# Patient Record
Sex: MALE | Race: WHITE | NOT HISPANIC OR LATINO | Employment: OTHER | URBAN - METROPOLITAN AREA
[De-identification: names, ages, dates, MRNs, and addresses within clinical notes are randomized per-mention and may not be internally consistent; named-entity substitution may affect disease eponyms.]

---

## 2022-07-20 ENCOUNTER — PRE-ADMISSION TESTING (OUTPATIENT)
Dept: ADMISSIONS | Facility: MEDICAL CENTER | Age: 72
End: 2022-07-20
Attending: UROLOGY
Payer: MEDICARE

## 2022-07-20 DIAGNOSIS — Z01.810 PRE-OPERATIVE CARDIOVASCULAR EXAMINATION: ICD-10-CM

## 2022-07-20 DIAGNOSIS — Z01.812 PRE-OPERATIVE LABORATORY EXAMINATION: ICD-10-CM

## 2022-07-20 LAB
ANION GAP SERPL CALC-SCNC: 14 MMOL/L (ref 7–16)
APPEARANCE UR: CLEAR
BACTERIA #/AREA URNS HPF: ABNORMAL /HPF
BILIRUB UR QL STRIP.AUTO: NEGATIVE
BUN SERPL-MCNC: 16 MG/DL (ref 8–22)
CALCIUM SERPL-MCNC: 9.2 MG/DL (ref 8.4–10.2)
CHLORIDE SERPL-SCNC: 102 MMOL/L (ref 96–112)
CO2 SERPL-SCNC: 23 MMOL/L (ref 20–33)
COLOR UR: YELLOW
CREAT SERPL-MCNC: 0.56 MG/DL (ref 0.5–1.4)
EKG IMPRESSION: NORMAL
EPI CELLS #/AREA URNS HPF: ABNORMAL /HPF
ERYTHROCYTE [DISTWIDTH] IN BLOOD BY AUTOMATED COUNT: 50.4 FL (ref 35.9–50)
GFR SERPLBLD CREATININE-BSD FMLA CKD-EPI: 105 ML/MIN/1.73 M 2
GLUCOSE SERPL-MCNC: 136 MG/DL (ref 65–99)
GLUCOSE UR STRIP.AUTO-MCNC: NEGATIVE MG/DL
HCT VFR BLD AUTO: 41.1 % (ref 42–52)
HGB BLD-MCNC: 13.9 G/DL (ref 14–18)
INR PPP: 0.98 (ref 0.87–1.13)
KETONES UR STRIP.AUTO-MCNC: ABNORMAL MG/DL
LEUKOCYTE ESTERASE UR QL STRIP.AUTO: ABNORMAL
MCH RBC QN AUTO: 32.6 PG (ref 27–33)
MCHC RBC AUTO-ENTMCNC: 33.8 G/DL (ref 33.7–35.3)
MCV RBC AUTO: 96.5 FL (ref 81.4–97.8)
MICRO URNS: ABNORMAL
NITRITE UR QL STRIP.AUTO: POSITIVE
PH UR STRIP.AUTO: 6 [PH] (ref 5–8)
PLATELET # BLD AUTO: 262 K/UL (ref 164–446)
PMV BLD AUTO: 9.5 FL (ref 9–12.9)
POTASSIUM SERPL-SCNC: 3.2 MMOL/L (ref 3.6–5.5)
PROT UR QL STRIP: NEGATIVE MG/DL
PROTHROMBIN TIME: 12.6 SEC (ref 12–14.6)
RBC # BLD AUTO: 4.26 M/UL (ref 4.7–6.1)
RBC # URNS HPF: ABNORMAL /HPF
RBC UR QL AUTO: NEGATIVE
SODIUM SERPL-SCNC: 139 MMOL/L (ref 135–145)
SP GR UR STRIP.AUTO: <=1.005
WBC # BLD AUTO: 6.9 K/UL (ref 4.8–10.8)
WBC #/AREA URNS HPF: ABNORMAL /HPF

## 2022-07-20 PROCEDURE — 80048 BASIC METABOLIC PNL TOTAL CA: CPT

## 2022-07-20 PROCEDURE — 87086 URINE CULTURE/COLONY COUNT: CPT

## 2022-07-20 PROCEDURE — 81001 URINALYSIS AUTO W/SCOPE: CPT

## 2022-07-20 PROCEDURE — 36415 COLL VENOUS BLD VENIPUNCTURE: CPT

## 2022-07-20 PROCEDURE — 87186 SC STD MICRODIL/AGAR DIL: CPT

## 2022-07-20 PROCEDURE — 93005 ELECTROCARDIOGRAM TRACING: CPT

## 2022-07-20 PROCEDURE — 85610 PROTHROMBIN TIME: CPT

## 2022-07-20 PROCEDURE — 93010 ELECTROCARDIOGRAM REPORT: CPT | Performed by: INTERNAL MEDICINE

## 2022-07-20 PROCEDURE — 87077 CULTURE AEROBIC IDENTIFY: CPT

## 2022-07-20 PROCEDURE — 85027 COMPLETE CBC AUTOMATED: CPT

## 2022-07-20 RX ORDER — ATORVASTATIN CALCIUM 10 MG/1
1 TABLET, FILM COATED ORAL
COMMUNITY
Start: 2022-05-25 | End: 2022-07-20

## 2022-07-20 RX ORDER — DOXYCYCLINE 100 MG/1
CAPSULE ORAL
COMMUNITY
Start: 2022-07-14 | End: 2022-07-20

## 2022-07-20 RX ORDER — POTASSIUM CHLORIDE 750 MG/1
TABLET, EXTENDED RELEASE ORAL
COMMUNITY
Start: 2022-07-08

## 2022-07-20 RX ORDER — TAMSULOSIN HYDROCHLORIDE 0.4 MG/1
0.4 CAPSULE ORAL EVERY EVENING
COMMUNITY
Start: 2022-06-01

## 2022-07-20 RX ORDER — PREDNISONE 5 MG/1
5 TABLET ORAL EVERY EVENING
COMMUNITY
Start: 2022-05-27

## 2022-07-20 RX ORDER — ABIRATERONE ACETATE 250 MG/1
2 TABLET ORAL DAILY
COMMUNITY
Start: 2022-05-27

## 2022-07-20 RX ORDER — DOXYCYCLINE 100 MG/1
CAPSULE ORAL
COMMUNITY
End: 2022-07-20

## 2022-07-20 RX ORDER — ATORVASTATIN CALCIUM 10 MG/1
10 TABLET, FILM COATED ORAL EVERY EVENING
COMMUNITY
Start: 2022-05-26

## 2022-07-20 NOTE — OR NURSING
"Preadmit appointment: \" Preparing for your Procedure information\" sheet given to patient with verbal and written instructions. Patient instructed to continue prescribed medications through the day before surgery, instructed to take the following medications the day of surgery per anesthesia protocol: ABIRATERONE                STOP/BANG protocol initiated and JOVANNY information sheet given to pt and encouraged to discuss possible JOVANNY with PCP.  "

## 2022-07-22 LAB
BACTERIA UR CULT: ABNORMAL
BACTERIA UR CULT: ABNORMAL
SIGNIFICANT IND 70042: ABNORMAL
SITE SITE: ABNORMAL
SOURCE SOURCE: ABNORMAL

## 2022-07-28 ENCOUNTER — ANESTHESIA (OUTPATIENT)
Dept: SURGERY | Facility: MEDICAL CENTER | Age: 72
End: 2022-07-28
Payer: MEDICARE

## 2022-07-28 ENCOUNTER — HOSPITAL ENCOUNTER (OUTPATIENT)
Facility: MEDICAL CENTER | Age: 72
End: 2022-07-28
Attending: UROLOGY | Admitting: UROLOGY
Payer: MEDICARE

## 2022-07-28 ENCOUNTER — ANESTHESIA EVENT (OUTPATIENT)
Dept: SURGERY | Facility: MEDICAL CENTER | Age: 72
End: 2022-07-28
Payer: MEDICARE

## 2022-07-28 VITALS
SYSTOLIC BLOOD PRESSURE: 153 MMHG | HEIGHT: 70 IN | RESPIRATION RATE: 18 BRPM | OXYGEN SATURATION: 94 % | BODY MASS INDEX: 23.66 KG/M2 | HEART RATE: 87 BPM | TEMPERATURE: 97.5 F | DIASTOLIC BLOOD PRESSURE: 91 MMHG | WEIGHT: 165.24 LBS

## 2022-07-28 LAB
GLUCOSE BLD STRIP.AUTO-MCNC: 110 MG/DL (ref 65–99)
PATHOLOGY CONSULT NOTE: NORMAL

## 2022-07-28 PROCEDURE — 160025 RECOVERY II MINUTES (STATS): Performed by: UROLOGY

## 2022-07-28 PROCEDURE — 88305 TISSUE EXAM BY PATHOLOGIST: CPT

## 2022-07-28 PROCEDURE — 160002 HCHG RECOVERY MINUTES (STAT): Performed by: UROLOGY

## 2022-07-28 PROCEDURE — 160036 HCHG PACU - EA ADDL 30 MINS PHASE I: Performed by: UROLOGY

## 2022-07-28 PROCEDURE — 88341 IMHCHEM/IMCYTCHM EA ADD ANTB: CPT

## 2022-07-28 PROCEDURE — 160035 HCHG PACU - 1ST 60 MINS PHASE I: Performed by: UROLOGY

## 2022-07-28 PROCEDURE — 700101 HCHG RX REV CODE 250: Performed by: ANESTHESIOLOGY

## 2022-07-28 PROCEDURE — 160028 HCHG SURGERY MINUTES - 1ST 30 MINS LEVEL 3: Performed by: UROLOGY

## 2022-07-28 PROCEDURE — 160046 HCHG PACU - 1ST 60 MINS PHASE II: Performed by: UROLOGY

## 2022-07-28 PROCEDURE — 99100 ANES PT EXTEME AGE<1 YR&>70: CPT | Performed by: ANESTHESIOLOGY

## 2022-07-28 PROCEDURE — 88342 IMHCHEM/IMCYTCHM 1ST ANTB: CPT

## 2022-07-28 PROCEDURE — 160048 HCHG OR STATISTICAL LEVEL 1-5: Performed by: UROLOGY

## 2022-07-28 PROCEDURE — 00914 ANES TRURL PX RESCJ PRST8: CPT | Performed by: ANESTHESIOLOGY

## 2022-07-28 PROCEDURE — 160009 HCHG ANES TIME/MIN: Performed by: UROLOGY

## 2022-07-28 PROCEDURE — 160039 HCHG SURGERY MINUTES - EA ADDL 1 MIN LEVEL 3: Performed by: UROLOGY

## 2022-07-28 PROCEDURE — 700111 HCHG RX REV CODE 636 W/ 250 OVERRIDE (IP): Performed by: ANESTHESIOLOGY

## 2022-07-28 PROCEDURE — 82962 GLUCOSE BLOOD TEST: CPT

## 2022-07-28 RX ORDER — OXYCODONE HCL 5 MG/5 ML
5 SOLUTION, ORAL ORAL
Status: DISCONTINUED | OUTPATIENT
Start: 2022-07-28 | End: 2022-07-28 | Stop reason: HOSPADM

## 2022-07-28 RX ORDER — HALOPERIDOL 5 MG/ML
1 INJECTION INTRAMUSCULAR
Status: DISCONTINUED | OUTPATIENT
Start: 2022-07-28 | End: 2022-07-28 | Stop reason: HOSPADM

## 2022-07-28 RX ORDER — SODIUM CHLORIDE, SODIUM LACTATE, POTASSIUM CHLORIDE, CALCIUM CHLORIDE 600; 310; 30; 20 MG/100ML; MG/100ML; MG/100ML; MG/100ML
INJECTION, SOLUTION INTRAVENOUS CONTINUOUS
Status: DISCONTINUED | OUTPATIENT
Start: 2022-07-28 | End: 2022-07-28 | Stop reason: HOSPADM

## 2022-07-28 RX ORDER — HYDROMORPHONE HYDROCHLORIDE 1 MG/ML
0.4 INJECTION, SOLUTION INTRAMUSCULAR; INTRAVENOUS; SUBCUTANEOUS
Status: DISCONTINUED | OUTPATIENT
Start: 2022-07-28 | End: 2022-07-28 | Stop reason: HOSPADM

## 2022-07-28 RX ORDER — ONDANSETRON 2 MG/ML
4 INJECTION INTRAMUSCULAR; INTRAVENOUS
Status: DISCONTINUED | OUTPATIENT
Start: 2022-07-28 | End: 2022-07-28 | Stop reason: HOSPADM

## 2022-07-28 RX ORDER — OXYCODONE HCL 5 MG/5 ML
10 SOLUTION, ORAL ORAL
Status: DISCONTINUED | OUTPATIENT
Start: 2022-07-28 | End: 2022-07-28 | Stop reason: HOSPADM

## 2022-07-28 RX ORDER — DIPHENHYDRAMINE HYDROCHLORIDE 50 MG/ML
12.5 INJECTION INTRAMUSCULAR; INTRAVENOUS
Status: DISCONTINUED | OUTPATIENT
Start: 2022-07-28 | End: 2022-07-28 | Stop reason: HOSPADM

## 2022-07-28 RX ORDER — MEPERIDINE HYDROCHLORIDE 25 MG/ML
6.25 INJECTION INTRAMUSCULAR; INTRAVENOUS; SUBCUTANEOUS
Status: DISCONTINUED | OUTPATIENT
Start: 2022-07-28 | End: 2022-07-28 | Stop reason: HOSPADM

## 2022-07-28 RX ORDER — CEFAZOLIN SODIUM 1 G/3ML
INJECTION, POWDER, FOR SOLUTION INTRAMUSCULAR; INTRAVENOUS PRN
Status: DISCONTINUED | OUTPATIENT
Start: 2022-07-28 | End: 2022-07-28 | Stop reason: SURG

## 2022-07-28 RX ORDER — HYDROMORPHONE HYDROCHLORIDE 1 MG/ML
0.1 INJECTION, SOLUTION INTRAMUSCULAR; INTRAVENOUS; SUBCUTANEOUS
Status: DISCONTINUED | OUTPATIENT
Start: 2022-07-28 | End: 2022-07-28 | Stop reason: HOSPADM

## 2022-07-28 RX ORDER — SODIUM CHLORIDE, SODIUM LACTATE, POTASSIUM CHLORIDE, CALCIUM CHLORIDE 600; 310; 30; 20 MG/100ML; MG/100ML; MG/100ML; MG/100ML
INJECTION, SOLUTION INTRAVENOUS CONTINUOUS
Status: ACTIVE | OUTPATIENT
Start: 2022-07-28 | End: 2022-07-28

## 2022-07-28 RX ORDER — FUROSEMIDE 10 MG/ML
INJECTION INTRAMUSCULAR; INTRAVENOUS PRN
Status: DISCONTINUED | OUTPATIENT
Start: 2022-07-28 | End: 2022-07-28 | Stop reason: SURG

## 2022-07-28 RX ORDER — LIDOCAINE HYDROCHLORIDE 20 MG/ML
INJECTION, SOLUTION EPIDURAL; INFILTRATION; INTRACAUDAL; PERINEURAL PRN
Status: DISCONTINUED | OUTPATIENT
Start: 2022-07-28 | End: 2022-07-28 | Stop reason: SURG

## 2022-07-28 RX ORDER — HYDROMORPHONE HYDROCHLORIDE 1 MG/ML
0.2 INJECTION, SOLUTION INTRAMUSCULAR; INTRAVENOUS; SUBCUTANEOUS
Status: DISCONTINUED | OUTPATIENT
Start: 2022-07-28 | End: 2022-07-28 | Stop reason: HOSPADM

## 2022-07-28 RX ADMIN — CEFAZOLIN 1 G: 330 INJECTION, POWDER, FOR SOLUTION INTRAMUSCULAR; INTRAVENOUS at 10:44

## 2022-07-28 RX ADMIN — LIDOCAINE HYDROCHLORIDE 100 MG: 20 INJECTION, SOLUTION EPIDURAL; INFILTRATION; INTRACAUDAL at 10:39

## 2022-07-28 RX ADMIN — PROPOFOL 200 MG: 10 INJECTION, EMULSION INTRAVENOUS at 10:39

## 2022-07-28 RX ADMIN — FENTANYL CITRATE 100 MCG: 50 INJECTION, SOLUTION INTRAMUSCULAR; INTRAVENOUS at 10:39

## 2022-07-28 RX ADMIN — FUROSEMIDE 10 MG: 10 INJECTION, SOLUTION INTRAMUSCULAR; INTRAVENOUS at 11:24

## 2022-07-28 RX ADMIN — EPHEDRINE SULFATE 10 MG: 50 INJECTION INTRAMUSCULAR; INTRAVENOUS; SUBCUTANEOUS at 10:44

## 2022-07-28 ASSESSMENT — PAIN DESCRIPTION - PAIN TYPE
TYPE: SURGICAL PAIN
TYPE: ACUTE PAIN

## 2022-07-28 NOTE — ANESTHESIA POSTPROCEDURE EVALUATION
Patient: Bradley Fuentes    Procedure Summary     Date: 07/28/22 Room / Location:  OR  / SURGERY [facility]    Anesthesia Start: 1024 Anesthesia Stop: 1125    Procedure: TURP, USING BUTTON ELECTRODE (Bilateral Urethra) Diagnosis: (BENIGN PROSTATIC HYPERPLASIA W/ OUTFLOW OBSTRUCTION)    Surgeons: Chico Gallegos M.D. Responsible Provider: Danny Dacosta M.D.    Anesthesia Type: general ASA Status: 2          Final Anesthesia Type: general  Last vitals  BP   Blood Pressure : 127/68    Temp   36.6 °C (97.8 °F)    Pulse   75   Resp   16    SpO2   94 %      Anesthesia Post Evaluation    Patient location during evaluation: PACU  Patient participation: complete - patient participated  Level of consciousness: awake and alert    Airway patency: patent  Anesthetic complications: no  Cardiovascular status: hemodynamically stable  Respiratory status: acceptable  Hydration status: euvolemic    PONV: none          There were no known complications for this encounter.     Nurse Pain Score: 2 (NPRS)

## 2022-07-28 NOTE — DISCHARGE INSTRUCTIONS
If any questions arise, call your provider.  If your provider is not available, please feel free to call the Surgical Center at [phone].    MEDICATIONS: Resume taking daily medication.  Take prescribed pain medication with food.  If no medication is prescribed, you may take non-aspirin pain medication if needed.  PAIN MEDICATION CAN BE VERY CONSTIPATING.  Take a stool softener or laxative such as senokot, pericolace, or milk of magnesia if needed.    Last pain medication given at ___________________________    What to Expect Post Anesthesia    Rest and take it easy for the first 24 hours.  A responsible adult is recommended to remain with you during that time.  It is normal to feel sleepy.  We encourage you to not do anything that requires balance, judgment or coordination.    FOR 24 HOURS DO NOT:  Drive, operate machinery or run household appliances.  Drink beer or alcoholic beverages.  Make important decisions or sign legal documents.    To avoid nausea, slowly advance diet as tolerated, avoiding spicy or greasy foods for the first day.  Add more substantial food to your diet according to your provider's instructions. INCREASE FLUIDS AND FIBER TO AVOID CONSTIPATION.    MILD FLU-LIKE SYMPTOMS ARE NORMAL.  YOU MAY EXPERIENCE GENERALIZED MUSCLE ACHES, THROAT IRRITATION, HEADACHE AND/OR SOME NAUSEA.    Transurethral Resection of the Prostate, Care After    This sheet gives you information about how to care for yourself after your procedure. Your health care provider may also give you more specific instructions. If you have problems or questions, contact your health care provider.  What can I expect after the procedure?  After the procedure, it is common to have:  Mild pain in your lower abdomen.  Soreness or mild discomfort in your penis from having the catheter inserted during the procedure.  A feeling of urgency when you need to urinate.  A small amount of blood in your urine. You may notice some small blood  clots in your urine. These are normal.  Follow these instructions at home:  Medicines  Take over-the-counter and prescription medicines only as told by your health care provider.  Ask your health care provider if the medicine prescribed to you:  Requires you to avoid driving or using heavy machinery.  Can cause constipation. You may need to take actions to prevent or treat constipation, such as:  Take over-the-counter or prescription medicines.  Eat foods that are high in fiber, such as fresh fruits and vegetables, whole grains, and beans.  Limit foods that are high in fat and processed sugars, such as fried or sweet foods.  Do not drive for 24 hours if you were given a sedative during your procedure.  Activity    Return to your normal activities as told by your health care provider. Ask your health care provider what activities are safe for you.  Do not lift anything that is heavier than 10 lb (4.5 kg), or the limit that you are told, for 3 weeks after the procedure or until your health care provider says that it is safe.  Avoid intense physical activity for as long as told by your health care provider.  Avoid sitting for a long time without moving. Get up and move around one or more times every few hours. This helps to prevent blood clots. You may increase your physical activity gradually as you start to feel better.  Lifestyle  Do not drink alcohol for as long as told by your health care provider. This is especially important if you are taking prescription pain medicines.  Do not engage in sexual activity until your health care provider says that you can do this.  General instructions    Do not take baths, swim, or use a hot tub until your health care provider approves.  Drink enough fluid to keep your urine pale yellow.  Urinate as soon as you feel the need to. Do not try to hold your urine for long periods of time.  If your health care provider approves, you may take a stool softener for 2-3 weeks to prevent  you from straining to have a bowel movement.  Wear compression stockings as told by your health care provider. These stockings help to prevent blood clots and reduce swelling in your legs.  Keep all follow-up visits as told by your health care provider. This is important.  Contact a health care provider if you have:  Difficulty urinating.  A fever.  Pain that gets worse or does not improve with medicine.  Blood in your urine that does not go away after 1 week of resting and drinking more fluids.  Swelling in your penis or testicles.  Get help right away if:  You are unable to urinate.  You are having more blood clots in your urine instead of fewer.  You have:  Large blood clots.  A lot of blood in your urine.  Pain in your back or lower abdomen.  Pain or swelling in your legs.  Chills and you are shaking.  Difficulty breathing or shortness of breath.  Summary  After the procedure, it is common to have a small amount of blood in your urine.  Avoid heavy lifting and intense physical activity for as long as told by your health care provider.  Urinate as soon as you feel the need to. Do not try to hold your urine for long periods of time.  Keep all follow-up visits as told by your health care provider. This is important.  This information is not intended to replace advice given to you by your health care provider. Make sure you discuss any questions you have with your health care provider.

## 2022-07-28 NOTE — OP REPORT
DATE OF SERVICE:  07/28/2022     NAME OF THE OPERATION:  Transurethral resection of prostate.     PREOPERATIVE DIAGNOSIS:    1.  BPH with obstruction.  2.  History of prostate cancer, status post radiation therapy.     POSTOPERATIVE DIAGNOSES:    1.  BPH with obstruction.  2.  History of prostate cancer, status post radiation therapy.      PRIMARY SURGEON:  Chico Gallegos MD     ANESTHESIOLOGIST:  Danny Dacosta MD     FINDINGS:  Approximately 35 gram trilobar gland with elevated bladder neck.    Severe trabeculations within the bladder.  Channel TURP like defect created at   the conclusion.     OPERATION IN DETAIL:  The patient was taken to the operating room and placed   on the operating table in supine position.  After administration of general   anesthetic, he was placed in lithotomy.  Genitals were prepped and draped   sterilely.  A male sound was used to gently dilate the urethra from 24 to   28-Turkish.  A 26-Turkish resectoscope was then passed into the bladder with the   visualizing obturator.  Urethra was within normal limits.  Prostatic urethra   was completely obstructed by lateral lobe hypertrophy as well as an elevated   bladder neck with median lobe hypertrophy.     In the bladder, no stones or tumors were identified.  There were severe   trabeculations.     A bipolar loop was then begun to resect the bladder neck and floor of the   prostate and much of the median lobe back towards the level of the   verumontanum.  A generous border at the verumontanum was maintained.     The patient's right lateral lobe and left lateral lobe were then resected back   such that a nice channel was created through the middle of the prostate   gland.  A bit of anterior tissue was also resected, especially at the bladder   neck.     Care was taken to resect very lightly at the apex though some of the   obstructive tissue was certainly resected.     The Vangie  was used to irrigate the bladder free of prostate  chips.    Cautery was used for hemostasis.  At the conclusion, there appeared to be   excellent hemostasis and a nice open channel through the prostate.  All   prostate chips had been removed.     A 22-Hebrew 3-way catheter passed easily into the bladder.  It was draining   clear irrigant on moderate rate CBI.  It is our intention this will be weaned   off.  He will be going home with a void trial in our office in approximately 4   days' time.        ______________________________  Chico Gallegos MD MCM/JIMMY/BERTHA    DD:  07/28/2022 11:14  DT:  07/28/2022 12:25    Job#:  [id number]

## 2022-07-28 NOTE — ANESTHESIA TIME REPORT
Anesthesia Start and Stop Event Times     Date Time Event    7/28/2022 1024 Ready for Procedure     1024 Anesthesia Start     1125 Anesthesia Stop        Responsible Staff  07/28/22    Name Role Begin End    Danny Dacosta M.D. Anesth 1024 1125        Overtime Reason:  no overtime (within assigned shift)    Comments:

## 2022-07-28 NOTE — OR NURSING
1115: To PACU from OR via gurney, sleeping, respirations spontaneous and non-labored via OPA. Riley visualized, CBI running, uo clear. Plan to keep pt in PACU for full hour per STOPBANG protocol.  1125: Report to Heather STOCK.

## 2022-07-28 NOTE — ANESTHESIA PREPROCEDURE EVALUATION
Case: [id number] Date/Time: 07/28/22 1015    Procedure: TURP, USING BUTTON ELECTRODE    Pre-op diagnosis: BENIGN PROSTATIC HYPERPLASIA W/ OUTFLOW OBSTRUCTION    Location:  OR 02 / SURGERY [facility]    Surgeons: Chico Gallegos M.D.          Relevant Problems   No relevant active problems       Physical Exam    Airway   Mallampati: II  TM distance: >3 FB  Neck ROM: full       Cardiovascular - normal exam  Rhythm: regular  Rate: normal  (-) murmur     Dental - normal exam           Pulmonary - normal exam  Breath sounds clear to auscultation     Abdominal    Neurological - normal exam                 Anesthesia Plan    ASA 2       Plan - general       Airway plan will be LMA          Induction: intravenous    Postoperative Plan: Postoperative administration of opioids is intended.    Pertinent diagnostic labs and testing reviewed    Informed Consent:    Anesthetic plan and risks discussed with patient.    Use of blood products discussed with: patient whom consented to blood products.

## 2022-07-28 NOTE — OR NURSING
1140: Report from Heather STOCK. Pt resting, denies pain and nausea. Complaining of sensation of needing to void, education on trevino provided.  1200: Pt resting, denies nausea and pain.  1215: No change.  1230: No change. Meets criteria to transfer to Stage 2.  1240: Report called to Fletcher STOCK

## 2022-07-28 NOTE — OR NURSING
1125 - Report received from Summer RN, VSS, oral airway in place    1134 - Pt arousable, Oral airway removed , pt denies pain/nausea, VSS    1140 - Report to Summer RN

## 2022-07-28 NOTE — OR SURGEON
Immediate Post OP Note    PreOp Diagnosis: bph with obstruction, prostate cancer      PostOp Diagnosis: same      Procedure(s):  TURP, bipolar - Wound Class: Clean    Surgeon(s):  Chico Gallegos M.D.    Anesthesiologist/Type of Anesthesia:  Anesthesiologist: Danny Dacosta M.D./General    Surgical Staff:  Circulator: Claudine Couch; Fauzia Beck R.N.  Scrub Person: Angel Pritchard    Specimens removed if any:  ID Type Source Tests Collected by Time Destination   A : Prostate Chips Tissue Prostate PATHOLOGY SPECIMEN Chico Gallegos M.D. 7/28/2022 11:00 AM        Estimated Blood Loss: <25mL    Findings: elevated bladder neck, trilobar hypertrophy, 35g gland, channel turp defect    Complications: none        7/28/2022 11:08 AM Chico Gallegos M.D.

## 2022-07-28 NOTE — ANESTHESIA PROCEDURE NOTES
Airway    Date/Time: 7/28/2022 10:39 AM  Performed by: Danny Dacosta M.D.  Authorized by: Danny Dacosta M.D.     Location:  OR  Urgency:  Elective  Indications for Airway Management:  Anesthesia      Spontaneous Ventilation: absent    Sedation Level:  Deep  Preoxygenated: Yes    Final Airway Type:  Supraglottic airway  Final Supraglottic Airway:  Standard LMA    SGA Size:  4  Number of Attempts at Approach:  1

## 2022-07-28 NOTE — OR NURSING
1246: To stage ll. Up to chair and dressed w/ CNA assist. No pain or nausea. Awaiting son.    1338: Home care instruction reviewed /w pt and son. No questions, meets critera for discharge.

## 2022-07-29 RX ORDER — ATROPA BELLADONNA AND OPIUM 16.2; 6 MG/1; MG/1
60 SUPPOSITORY RECTAL
Status: DISCONTINUED | OUTPATIENT
Start: 2022-07-29 | End: 2022-07-29

## 2022-07-29 RX ORDER — ATROPA BELLADONNA AND OPIUM 16.2; 6 MG/1; MG/1
30 SUPPOSITORY RECTAL
Status: CANCELLED | OUTPATIENT
Start: 2022-07-29

## 2022-07-29 NOTE — OR NURSING
Late entry for 7/28/2022 at 11 am.  Verbal order with read back from Dr. Gallegos for B&O suppository 30mg at end of case.  Med pulled from [employer] and provided to sterile back table. Suppository administered to pt by physician.

## (undated) DEVICE — SET IRRIGATION CYSTOSCOPY Y-TYPE L81 IN (20EA/CA)

## (undated) DEVICE — CATHETER FOLEY 22 FR 30 CC (12EA/CA)

## (undated) DEVICE — SPONGE GAUZESTER 4 X 4 4PLY - (128PK/CA)

## (undated) DEVICE — GLOVE BIOGEL SZ 8 SURGICAL PF LTX - (50PR/BX 4BX/CA)

## (undated) DEVICE — TUBE CONNECTING SUCTION - CLEAR PLASTIC STERILE 72 IN (50EA/CA)

## (undated) DEVICE — BAG SPONGE COUNT 10.25 X 32 - BLUE (250/CA)

## (undated) DEVICE — KIT ANESTHESIA W/CIRCUIT & 3/LT BAG W/FILTER (20EA/CA)

## (undated) DEVICE — CANISTER SUCTION RIGID RED 1500CC (40EA/CA)

## (undated) DEVICE — WATER IRRIG. STER 3000 ML - (4/CA)

## (undated) DEVICE — SENSOR OXIMETER ADULT SPO2 RD SET (20EA/BX)

## (undated) DEVICE — TOWEL STOP TIMEOUT SAFETY FLAG (40EA/CA)

## (undated) DEVICE — ELECTRODE 850 FOAM ADHESIVE - HYDROGEL RADIOTRNSPRNT (50/PK)

## (undated) DEVICE — SYRINGE 30 ML LL (56/BX)

## (undated) DEVICE — HUMID-VENT HEAT AND MOISTURE EXCHANGE- (50/BX)

## (undated) DEVICE — WATER IRRIGATION STERILE 1000ML (12EA/CA)

## (undated) DEVICE — PROTECTOR ULNA NERVE - (36PR/CA)

## (undated) DEVICE — GOWN SURGICAL X-LARGE ULTRA - FILM-REINFORCED (20/CA)

## (undated) DEVICE — SET LEADWIRE 5 LEAD BEDSIDE DISPOSABLE ECG (1SET OF 5/EA)

## (undated) DEVICE — LACTATED RINGERS INJ 1000 ML - (14EA/CA 60CA/PF)

## (undated) DEVICE — PACK SINGLE BASIN - (6/CA)

## (undated) DEVICE — JELLY SURGILUBE STERILE TUBE 4.25 OZ (1/EA)

## (undated) DEVICE — SODIUM CHL IRRIGATION 0.9% 1000ML (12EA/CA)

## (undated) DEVICE — COVER FOOT UNIVERSAL DISP. - (25EA/CA)

## (undated) DEVICE — SET EPIDURAL MICRO SAPPHIRE EPIDAURAL INFUSION  (1/EA)

## (undated) DEVICE — HEAD HOLDER JUNIOR/ADULT

## (undated) DEVICE — GOWN WARMING STANDARD FLEX - (30/CA)

## (undated) DEVICE — SLEEVE VASO CALF MED - (10PR/CA)

## (undated) DEVICE — SUCTION INSTRUMENT YANKAUER BULBOUS TIP W/O VENT (50EA/CA)

## (undated) DEVICE — MASK ANESTHESIA ADULT  - (100/CA)

## (undated) DEVICE — GLYCINE IRRIGATION 1.5% - 3000 ML  (4/CS)

## (undated) DEVICE — BAG DRAINAGE URINARY CLOSED 2000ML (20EA/CA)

## (undated) DEVICE — ELECTRODE DUAL RETURN W/ CORD - (50/PK)